# Patient Record
Sex: FEMALE | URBAN - METROPOLITAN AREA
[De-identification: names, ages, dates, MRNs, and addresses within clinical notes are randomized per-mention and may not be internally consistent; named-entity substitution may affect disease eponyms.]

---

## 2023-07-18 ENCOUNTER — OFFICE VISIT (OUTPATIENT)
Dept: URBAN - METROPOLITAN AREA CLINIC 80 | Facility: CLINIC | Age: 53
End: 2023-07-18

## 2023-07-18 NOTE — HPI-TODAY'S VISIT:
This patient was referred by Tania Vizcaino DNP for an evaluation for colon cancer screening.  A copy of this will be sent to the referring provider.  The last colonoscopy was  . The patient denies anemia, change in bowel habits, abdominal pain, blood in stool, or weight loss. There is no family history of colon cancer or colon polyps.

## 2023-07-19 ENCOUNTER — OFFICE VISIT (OUTPATIENT)
Dept: URBAN - METROPOLITAN AREA CLINIC 126 | Facility: CLINIC | Age: 53
End: 2023-07-19